# Patient Record
Sex: FEMALE | Race: WHITE | Employment: UNEMPLOYED | URBAN - METROPOLITAN AREA
[De-identification: names, ages, dates, MRNs, and addresses within clinical notes are randomized per-mention and may not be internally consistent; named-entity substitution may affect disease eponyms.]

---

## 2017-01-27 PROBLEM — F41.9 ANXIETY: Status: ACTIVE | Noted: 2017-01-27

## 2017-02-01 PROCEDURE — 80061 LIPID PANEL: CPT | Performed by: FAMILY MEDICINE

## 2017-02-01 PROCEDURE — 85025 COMPLETE CBC W/AUTO DIFF WBC: CPT | Performed by: FAMILY MEDICINE

## 2017-02-01 PROCEDURE — 82306 VITAMIN D 25 HYDROXY: CPT | Performed by: FAMILY MEDICINE

## 2017-02-01 PROCEDURE — 36415 COLL VENOUS BLD VENIPUNCTURE: CPT | Performed by: FAMILY MEDICINE

## 2017-02-01 PROCEDURE — 80053 COMPREHEN METABOLIC PANEL: CPT | Performed by: FAMILY MEDICINE

## 2017-02-01 PROCEDURE — 83036 HEMOGLOBIN GLYCOSYLATED A1C: CPT | Performed by: FAMILY MEDICINE

## 2017-02-21 PROBLEM — Z85.850 HX OF PAPILLARY THYROID CARCINOMA: Status: ACTIVE | Noted: 2017-02-21

## 2017-02-21 PROCEDURE — 84432 ASSAY OF THYROGLOBULIN: CPT | Performed by: INTERNAL MEDICINE

## 2017-02-21 PROCEDURE — 86800 THYROGLOBULIN ANTIBODY: CPT | Performed by: INTERNAL MEDICINE

## 2017-03-22 PROCEDURE — 86038 ANTINUCLEAR ANTIBODIES: CPT | Performed by: INTERNAL MEDICINE

## 2017-03-22 PROCEDURE — 82784 ASSAY IGA/IGD/IGG/IGM EACH: CPT | Performed by: INTERNAL MEDICINE

## 2017-03-22 PROCEDURE — 82390 ASSAY OF CERULOPLASMIN: CPT | Performed by: INTERNAL MEDICINE

## 2017-03-22 PROCEDURE — 84432 ASSAY OF THYROGLOBULIN: CPT | Performed by: INTERNAL MEDICINE

## 2017-03-22 PROCEDURE — 83516 IMMUNOASSAY NONANTIBODY: CPT | Performed by: INTERNAL MEDICINE

## 2017-03-22 PROCEDURE — 86255 FLUORESCENT ANTIBODY SCREEN: CPT | Performed by: INTERNAL MEDICINE

## 2017-03-22 PROCEDURE — 86376 MICROSOMAL ANTIBODY EACH: CPT | Performed by: INTERNAL MEDICINE

## 2017-03-22 PROCEDURE — 82104 ALPHA-1-ANTITRYPSIN PHENO: CPT | Performed by: INTERNAL MEDICINE

## 2017-03-22 PROCEDURE — 82103 ALPHA-1-ANTITRYPSIN TOTAL: CPT | Performed by: INTERNAL MEDICINE

## 2017-04-28 PROCEDURE — 88305 TISSUE EXAM BY PATHOLOGIST: CPT | Performed by: INTERNAL MEDICINE

## 2018-05-30 PROBLEM — Z80.8 FAMILY HISTORY OF NONMELANOMA SKIN CANCER: Status: ACTIVE | Noted: 2018-05-30

## 2019-06-06 PROCEDURE — 87624 HPV HI-RISK TYP POOLED RSLT: CPT | Performed by: FAMILY MEDICINE

## 2019-06-06 PROCEDURE — 88175 CYTOPATH C/V AUTO FLUID REDO: CPT | Performed by: FAMILY MEDICINE

## 2019-08-01 PROCEDURE — 84432 ASSAY OF THYROGLOBULIN: CPT | Performed by: INTERNAL MEDICINE

## 2019-08-01 PROCEDURE — 86800 THYROGLOBULIN ANTIBODY: CPT | Performed by: INTERNAL MEDICINE

## 2020-11-12 ENCOUNTER — APPOINTMENT (OUTPATIENT)
Dept: GENERAL RADIOLOGY | Age: 55
End: 2020-11-12
Attending: PHYSICIAN ASSISTANT
Payer: COMMERCIAL

## 2020-11-12 ENCOUNTER — HOSPITAL ENCOUNTER (EMERGENCY)
Age: 55
Discharge: HOME | End: 2020-11-12
Attending: EMERGENCY MEDICINE
Payer: COMMERCIAL

## 2020-11-12 VITALS
OXYGEN SATURATION: 97 % | SYSTOLIC BLOOD PRESSURE: 124 MMHG | HEART RATE: 80 BPM | RESPIRATION RATE: 18 BRPM | TEMPERATURE: 98 F | DIASTOLIC BLOOD PRESSURE: 74 MMHG | WEIGHT: 290 LBS | HEIGHT: 66 IN | BODY MASS INDEX: 46.61 KG/M2

## 2020-11-12 DIAGNOSIS — S82.832A CLOSED AVULSION FRACTURE OF DISTAL END OF LEFT FIBULA, INITIAL ENCOUNTER: ICD-10-CM

## 2020-11-12 DIAGNOSIS — S82.831A CLOSED FRACTURE OF DISTAL END OF RIGHT FIBULA, UNSPECIFIED FRACTURE MORPHOLOGY, INITIAL ENCOUNTER: Primary | ICD-10-CM

## 2020-11-12 PROCEDURE — 29515 APPLICATION SHORT LEG SPLINT: CPT

## 2020-11-12 PROCEDURE — 99284 EMERGENCY DEPT VISIT MOD MDM: CPT

## 2020-11-12 PROCEDURE — 73610 X-RAY EXAM OF ANKLE: CPT | Performed by: PHYSICIAN ASSISTANT

## 2020-11-12 PROCEDURE — 73590 X-RAY EXAM OF LOWER LEG: CPT | Performed by: PHYSICIAN ASSISTANT

## 2020-11-12 RX ORDER — HYDROCODONE BITARTRATE AND ACETAMINOPHEN 5; 325 MG/1; MG/1
1 TABLET ORAL EVERY 6 HOURS PRN
Qty: 10 TABLET | Refills: 0 | Status: SHIPPED | OUTPATIENT
Start: 2020-11-12 | End: 2020-11-19

## 2020-11-12 RX ORDER — IBUPROFEN 600 MG/1
600 TABLET ORAL ONCE
Status: COMPLETED | OUTPATIENT
Start: 2020-11-12 | End: 2020-11-12

## 2020-11-12 NOTE — ED INITIAL ASSESSMENT (HPI)
Bilateral ankle, right tib fib, r knee pain after taking dog outside and mis stepped on uneven pavement. Pt denies hitting head.

## 2020-11-12 NOTE — ED PROVIDER NOTES
Patient Seen in: THE [facility] In [place]      History   Patient presents with:  Leg or Foot Injury    Stated Complaint: bilateral ankle injury while stepping on uneven pavement.     HPI    78-year-old female who comes in today stating th carcinoma by Dr. Cindy Wilson at [facility]   • [address]  3/19/14 [address]    intraductal hyperplasia. Left breast x2 - 2 o'clock & 9 o'clock position                    Social History    Tobacco Use      Smoking status: Never Smoker      Smokeless t bilateral ankle injury while stepping on uneven pavement. PATIENT STATED HISTORY: (As transcribed by Technologist)  Patient stated that she fell approximately fourty five minutes ago. Pain and swelling on her right and left lateral malleoli.  Patient was u XR ANKLE (MIN 3 VIEWS), RIGHT (CPT=73610), 11/12/2020, 3:59 PM.  [place], XR, XR ANKLE (MIN 3 VIEWS), LEFT (CPT=73610), 11/12/2020, 4:01 PM.  INDICATIONS:  bilateral ankle injury while stepping on uneven pavement.   PATIENT STATED HISTORY: (As transcribe pm    Follow-up:  Supriya Moreno MD  [address] [facility]  [phone]          Chandana Jones, [address] [place] [id number]    Schedule an appointment as soon as possible for a visi

## 2024-01-29 NOTE — LETTER
Date & Time: 11/12/2020, 5:21 PM  Patient: Yuri Alie  Encounter Provider(s):    Norma Taylor MD Enid Chiles, PA-C       To Whom It May Concern:    Barbra Monique was seen and treated in our department on 11/12/2020.  She please allow patie
Vital Signs Last 24 Hrs  T(C): 36.5 (29 Jan 2024 11:04), Max: 37 (28 Jan 2024 16:57)  T(F): 97.7 (29 Jan 2024 11:04), Max: 98.6 (28 Jan 2024 16:57)  HR: 101 (29 Jan 2024 11:04) (73 - 106)  BP: 103/68 (29 Jan 2024 11:04) (103/68 - 131/80)  BP(mean): --  RR: 18 (29 Jan 2024 11:04) (17 - 20)  SpO2: 95% (29 Jan 2024 11:04) (94% - 98%)    Parameters below as of 28 Jan 2024 16:57  Patient On (Oxygen Delivery Method): room air

## 2024-08-04 ENCOUNTER — HOSPITAL ENCOUNTER (EMERGENCY)
Age: 59
Discharge: HOME | End: 2024-08-04
Payer: COMMERCIAL

## 2024-08-04 VITALS
WEIGHT: 180 LBS | OXYGEN SATURATION: 96 % | RESPIRATION RATE: 16 BRPM | DIASTOLIC BLOOD PRESSURE: 71 MMHG | BODY MASS INDEX: 29.99 KG/M2 | TEMPERATURE: 97 F | SYSTOLIC BLOOD PRESSURE: 165 MMHG | HEIGHT: 65 IN | HEART RATE: 102 BPM

## 2024-08-04 DIAGNOSIS — S61.211A LACERATION OF LEFT INDEX FINGER WITHOUT FOREIGN BODY WITHOUT DAMAGE TO NAIL, INITIAL ENCOUNTER: Primary | ICD-10-CM

## 2024-08-04 PROCEDURE — 90471 IMMUNIZATION ADMIN: CPT

## 2024-08-04 PROCEDURE — 99283 EMERGENCY DEPT VISIT LOW MDM: CPT

## 2024-08-04 PROCEDURE — 12001 RPR S/N/AX/GEN/TRNK 2.5CM/<: CPT

## 2024-08-04 RX ORDER — DOXYCYCLINE HYCLATE 100 MG/1
100 CAPSULE ORAL 2 TIMES DAILY
Qty: 14 CAPSULE | Refills: 0 | Status: SHIPPED | OUTPATIENT
Start: 2024-08-04 | End: 2024-08-11

## 2024-08-04 NOTE — ED PROVIDER NOTES
Patient Seen in: [facility] In [place]      History     Chief Complaint   Patient presents with    Laceration/Abrasion     Stated Complaint: laceration left hand 2nd digit    Subjective:   58-year-old female who presents with a laceration to the left index finger.  States prior to arrival, she was in the kitchen cutting something with a knife and accidentally cut her left index finger.  Denies use of blood thinners.  Tetanus vaccination is not up-to-date.            Objective:   Past Medical History:    Anxiety    Bell's palsy    Dx in 10/2014 - treated with meds    Depression    Elevated LFTs    HEADACHES    treated with excedrin    Hypercholesteremia    Malignant neoplasm of thyroid gland (HCC)    Papillary thyroid carcinoma measuring 0.5 cm in the left lobe with margins uninvolved by carcinoma and vascular invasion - T1a Nx Mx - Stage I    Nontoxic multinodular goiter    Thyroid U/S from 2011 showed a multinodular goiter with a dominant left nodule of 1 cm with calcifications.    Other and unspecified hyperlipidemia    Postsurgical hypothyroidism    Total thyroidectomy for papillary thyroid carcinoma    SEASONAL ALLERGIES              Past Surgical History:   Procedure Laterality Date          x 4    Cholecystectomy  2000    Laparascopic cholecystectomy    Colonoscopy N/A 2017    Procedure: COLONOSCOPY, POSSIBLE BIOPSY, POSSIBLE POLYPECTOMY 53978;  Surgeon: Roland Jacob MD;  Location: [facility]    Colonoscopy & polypectomy  - repeat     polyp- adenoma    Needle biopsy left  3/19/2014    Other surgical history      Bunionectomy in the left foot    Other surgical history  2011    Total thyroidectomy for papillary thyroid carcinoma by Dr. Flores at [facility]    Stereotactic breast biopsy  3/19/14 [facility]    intraductal hyperplasia.Left breast x2 - 2 o'clock & 9 o'clock position                Social History     Socioeconomic History    Marital  status:     Number of children: 4   Occupational History    Occupation: Homemaker   Tobacco Use    Smoking status: Never    Smokeless tobacco: Never   Vaping Use    Vaping status: Never Used   Substance and Sexual Activity    Alcohol use: Yes     Alcohol/week: 0.0 standard drinks of alcohol     Comment: Social    Drug use: No              Review of Systems   Constitutional: Negative.    Skin:  Positive for wound.   All other systems reviewed and are negative.      Positive for stated Chief Complaint: Laceration/Abrasion    Other systems are as noted in HPI.  Constitutional and vital signs reviewed.      All other systems reviewed and negative except as noted above.    Physical Exam     ED Triage Vitals [08/04/24 1800]   BP (!) 165/71   Pulse 102   Resp 16   Temp 97.4 °F (36.3 °C)   Temp src Temporal   SpO2 96 %   O2 Device None (Room air)       Current Vitals:   Vital Signs  BP: (!) 165/71  Pulse: 102  Resp: 16  Temp: 97.4 °F (36.3 °C)  Temp src: Temporal    Oxygen Therapy  SpO2: 96 %  O2 Device: None (Room air)            Physical Exam  Vitals and nursing note reviewed.   Constitutional:       Appearance: She is not ill-appearing, toxic-appearing or diaphoretic.   Musculoskeletal:      Comments: Left hand exam is normal except for an approximate 1.5 cm laceration to the palmar aspect of the middle of the left index finger.  Radial, ulnar, radial nerves intact.  Normal pulses.   Skin:     Capillary Refill: Capillary refill takes less than 2 seconds.      Coloration: Skin is not pale.   Neurological:      General: No focal deficit present.      Mental Status: She is alert and oriented to person, place, and time.   Psychiatric:         Mood and Affect: Mood normal.         Behavior: Behavior normal.               ED Course   Labs Reviewed - No data to display  Verbal consent for the following procedure was obtained.  We discussed the inherent risks of procedure.  We discussed benefits.  Patient agrees to  proceed with the procedure and verbalizes understanding of potential complications.     Laceration Repair Procedure Note  I discussed risks and benefits and verbal consent was obtained. Time out performed.  Location: Finger index  left  Length: 2.5 cm  Cleaning: standard  Foreign Body: no foreign bodies  The wound area was irrigated with sterile saline and draped in a sterile fashion.  The wound area was anesthetized with Lidocaine 1% without epinephrine.  The wound was explored with the following results No foreign bodies found.  The wound was repaired with 5-0 Ethilon; 5 sutures were used.  Suture Technique: Simple  The wound was dressed and antibiotic ointment was applied.  Patient tolerated the procedure without complications.                 MDM                                         Medical Decision Making  58-year-old female with finger laceration.  No neurovascular deficits.  See procedure note for laceration repair.    Risk  OTC drugs.  Prescription drug management.        Disposition and Plan     Clinical Impression:  1. Laceration of left index finger without foreign body without damage to nail, initial encounter         Disposition:  Discharge  8/4/2024  7:25 pm    Follow-up:  No follow-up provider specified.        Medications Prescribed:  Current Discharge Medication List        START taking these medications    Details   doxycycline 100 MG Oral Cap Take 1 capsule (100 mg total) by mouth 2 (two) times daily for 7 days.  Qty: 14 capsule, Refills: 0